# Patient Record
Sex: MALE | Race: WHITE | URBAN - METROPOLITAN AREA
[De-identification: names, ages, dates, MRNs, and addresses within clinical notes are randomized per-mention and may not be internally consistent; named-entity substitution may affect disease eponyms.]

---

## 2018-08-27 ENCOUNTER — TRANSFERRED RECORDS (OUTPATIENT)
Dept: HEALTH INFORMATION MANAGEMENT | Facility: CLINIC | Age: 73
End: 2018-08-27

## 2018-09-04 ENCOUNTER — TRANSFERRED RECORDS (OUTPATIENT)
Dept: HEALTH INFORMATION MANAGEMENT | Facility: CLINIC | Age: 73
End: 2018-09-04

## 2018-11-09 ENCOUNTER — TRANSFERRED RECORDS (OUTPATIENT)
Dept: HEALTH INFORMATION MANAGEMENT | Facility: CLINIC | Age: 73
End: 2018-11-09

## 2018-11-28 ENCOUNTER — PATIENT OUTREACH (OUTPATIENT)
Dept: CARE COORDINATION | Facility: CLINIC | Age: 73
End: 2018-11-28

## 2018-11-30 ENCOUNTER — OFFICE VISIT (OUTPATIENT)
Dept: INFECTIOUS DISEASES | Facility: CLINIC | Age: 73
End: 2018-11-30
Payer: MEDICARE

## 2018-11-30 VITALS
WEIGHT: 176.9 LBS | OXYGEN SATURATION: 95 % | BODY MASS INDEX: 28.43 KG/M2 | SYSTOLIC BLOOD PRESSURE: 154 MMHG | DIASTOLIC BLOOD PRESSURE: 92 MMHG | TEMPERATURE: 98.2 F | HEIGHT: 66 IN | HEART RATE: 80 BPM

## 2018-11-30 DIAGNOSIS — L73.9 FOLLICULITIS: Primary | ICD-10-CM

## 2018-11-30 DIAGNOSIS — Z22.321 COLONIZATION WITH MSSA (METHICILLIN-SUSCEPTIBLE STAPHYLOCOCCUS AUREUS): ICD-10-CM

## 2018-11-30 PROCEDURE — G0463 HOSPITAL OUTPT CLINIC VISIT: HCPCS | Mod: ZF

## 2018-11-30 RX ORDER — NEBULIZER ACCESSORIES
EACH MISCELLANEOUS
COMMUNITY
Start: 2018-04-18

## 2018-11-30 RX ORDER — MONTELUKAST SODIUM 10 MG/1
10 TABLET ORAL
COMMUNITY
Start: 2018-06-26

## 2018-11-30 RX ORDER — VENLAFAXINE HYDROCHLORIDE 75 MG/1
CAPSULE, EXTENDED RELEASE ORAL
Refills: 3 | COMMUNITY
Start: 2018-09-21

## 2018-11-30 RX ORDER — BETAMETHASONE DIPROPIONATE 0.5 MG/G
OINTMENT, AUGMENTED TOPICAL
COMMUNITY
Start: 2018-09-28

## 2018-11-30 RX ORDER — FLUTICASONE PROPIONATE 220 UG/1
AEROSOL, METERED RESPIRATORY (INHALATION)
Refills: 12 | COMMUNITY
Start: 2018-11-20

## 2018-11-30 RX ORDER — METOPROLOL TARTRATE 25 MG/1
TABLET, FILM COATED ORAL
Refills: 3 | COMMUNITY
Start: 2018-09-21

## 2018-11-30 RX ORDER — ACETAMINOPHEN 325 MG/1
2 TABLET ORAL
COMMUNITY

## 2018-11-30 RX ORDER — IPRATROPIUM BROMIDE AND ALBUTEROL SULFATE 2.5; .5 MG/3ML; MG/3ML
3 SOLUTION RESPIRATORY (INHALATION)
COMMUNITY
Start: 2018-04-18

## 2018-11-30 RX ORDER — DIPHENOXYLATE HYDROCHLORIDE AND ATROPINE SULFATE 2.5; .025 MG/1; MG/1
1 TABLET ORAL
COMMUNITY

## 2018-11-30 RX ORDER — ALBUTEROL SULFATE 90 UG/1
2 AEROSOL, METERED RESPIRATORY (INHALATION)
COMMUNITY
Start: 2018-06-26

## 2018-11-30 ASSESSMENT — PAIN SCALES - GENERAL: PAINLEVEL: NO PAIN (0)

## 2018-11-30 NOTE — NURSING NOTE
"Chief Complaint   Patient presents with     Consult     staph skin infection      BP (!) 154/92  Pulse 80  Temp 98.2  F (36.8  C) (Oral)  Ht 1.676 m (5' 6\")  Wt 80.2 kg (176 lb 14.4 oz)  SpO2 95%  BMI 28.55 kg/m2  Marzena Ivory, CMA    "

## 2018-11-30 NOTE — MR AVS SNAPSHOT
"              After Visit Summary   11/30/2018    Brandon Damian    MRN: [id number]           Patient Information     Date Of Birth          1945        Visit Information        Provider Department      11/30/2018 1:30 PM Kyler Mcfarland MD [facility] Infectious Diseases        Today's Diagnoses     Folliculitis    -  1    Colonization with MSSA (methicillin-susceptible Staphylococcus aureus)           Follow-ups after your visit        Follow-up notes from your care team     Return if symptoms worsen or fail to improve.      Who to contact     If you have questions or need follow up information about today's clinic visit or your schedule please contact [facility] AND INFECTIOUS DISEASES directly at [phone].  Normal or non-critical lab and imaging results will be communicated to you by MyChart, letter or phone within 4 business days after the clinic has received the results. If you do not hear from us within 7 days, please contact the clinic through MyChart or phone. If you have a critical or abnormal lab result, we will notify you by phone as soon as possible.  Submit refill requests through [employer] or call your pharmacy and they will forward the refill request to us. Please allow 3 business days for your refill to be completed.          Additional Information About Your Visit        MyChart Information     [employer] lets you send messages to your doctor, view your test results, renew your prescriptions, schedule appointments and more. To sign up, go to [email or web address] . Click on \"Log in\" on the left side of the screen, which will take you to the Welcome page. Then click on \"Sign up Now\" on the right side of the page.     You will be asked to enter the access code listed below, as well as some personal information. Please follow the directions to create your username and password.     Your access code is: [email or web address]  Expires: 2/14/2019  6:30 AM     Your access " "code will  in 90 days. If you need help or a new code, please call your [place] clinic or [phone].        Care EveryWhere ID     This is your Care EveryWhere ID. This could be used by other organizations to access your [place] medical records  [id number]        Your Vitals Were     Pulse Temperature Height Pulse Oximetry BMI (Body Mass Index)       80 98.2  F (36.8  C) (Oral) 1.676 m (5' 6\") 95% 28.55 kg/m2        Blood Pressure from Last 3 Encounters:   18 (!) 154/92    Weight from Last 3 Encounters:   18 80.2 kg (176 lb 14.4 oz)              Today, you had the following     No orders found for display       Primary Care Provider Office Phone # Fax #    Bryan Fritsch [phone]       [place] [address] [facility] [address]        Equal Access to Services     [facility]Y [facility] : Hadii aad ku hadasho Soomaali, waaxda luqadaha, qaybta kaalmada adeegyada, waxay idiin hayaan adeeg kharash latj . So [facility] [phone].    ATENCIÓN: Si habla español, tiene a peng disposición servicios gratuitos de asistencia lingüística. Llame al [phone].    We comply with applicable federal civil rights laws and [place] laws. We do not discriminate on the basis of race, color, national origin, age, disability, sex, sexual orientation, or gender identity.            Thank you!     Thank you for choosing [facility] INFECTIOUS DISEASES  for your care. Our goal is always to provide you with excellent care. Hearing back from our patients is one way we can continue to improve our services. Please take a few minutes to complete the written survey that you may receive in the mail after your visit with us. Thank you!             Your Updated Medication List - Protect others around you: Learn how to safely use, store and throw away your medicines at www.disposemymeds.org.          This list is accurate as of 18 11:59 PM.  Always use your most recent med list. "                   Brand Name Dispense Instructions for use Diagnosis    acetaminophen 325 MG tablet    TYLENOL     Take 2 tablets by mouth        albuterol 108 (90 Base) MCG/ACT inhaler    PROAIR HFA/PROVENTIL HFA/VENTOLIN HFA     Inhale 2 puffs into the lungs        augmented betamethasone dipropionate 0.05 % external ointment    DIPROLENE-AF     APPLY TO AFFECTED AREA(S) TWO TIMES A DAY FOR FLARES        FLOVENT  MCG/ACT inhaler   Generic drug:  fluticasone      INHALE ONE PUFF BY MOUTH TWICE A DAY        ipratropium - albuterol 0.5 mg/2.5 mg/3 mL 0.5-2.5 (3) MG/3ML neb solution    DUONEB     Inhale 3 mLs into the lungs        metoprolol tartrate 25 MG tablet    LOPRESSOR     TAKE THREE TABLETS BY MOUTH TWICE A DAY        montelukast 10 MG tablet    SINGULAIR     Take 10 mg by mouth        MULTI-VITAMINS Tabs      Take 1 tablet by mouth        Nebulizer Air Tube/Plugs Misc      Nebulizer, disposable neb kit x 4, reuseable neb kit x 1, mask x 1, filters x 1.   Frequency of use: daily; Length of need: until better        venlafaxine 75 MG 24 hr capsule    EFFEXOR-XR     TAKE ONE CAPSULE BY MOUTH ONCE DAILY WITH MEALS

## 2018-12-03 NOTE — PROGRESS NOTES
[facility]  Infectious Disease Clinic Note:  New Patient     Patient:  Brandon Damian, Date of birth 1945, Medical record number [id number]  Date of Visit:  12/02/2018  Consult requested by Dr Rober Shanks for recurrent staph aureus infections    This note was attested by Dr. Kerr  ATTESTATION   I interviewed and examined the patient myself. I also reviewed the chart and discussed the case with Dr. Mcfarland.   I agree with the assessment and plan as written which reflects my overall impression and recommendations.     Recurrent folliculitis that may benefit from decolonization though the success rate is limited.       Carroll Kerr  Seen and examined on 11/30/2018  Attested on 12/4/2018   Pager: [phone]           Assessment and Recommendations:   Assessment: 74 y/o M with recurrent furuncles secondary to MSSA over back.     Discussion: Decolonization can be attempted in this patient and wife, albeit with limited expectation of success. Did discuss the option of his wife getting a nasal screen and culture first, but his wife preferred that she undergo the treatment as well over waiting until she was tested. This was agreed upon.      Recommendations:  1. Chlorhexidine showers for patient and wife  - instructions provided to use 4% solution, from neck down, avoiding eyes, ears, nose and mouth, massage throughout body with special attention to crevices and folds, lather and rinse. To use daily for 5 days. To repeat after 1 month.  - Explained that effectiveness of treatment is only about 50%. Pt and wife expressed understanding.   - Patient's wife expressed concern about CHG containing formaldehyde which the patient is allergic to. Recommended Betasept as an option after checking ingredients online - no Formaldehyde or Iodine. Recommended double checking with pharmacist when buying OTC.  2. Avoid sharing things that come in contact with skin, including razors, towels, athletic  equipment.  3. To clean environmental fomites, including bedding, surfaces, countertops.   4. To consult with Vet and decolonize pet dog as well.     ID will sign off at this time.  Please do not hesitate to call with questions.  Follow up as needed.     Plan of care discussed with Staff ID attending Dr Carroll Kerr.    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     Per wife, patient has had 3 episodes of these infections over the past 6 months. The first one was in July, the second end of August, and the third early November. She is a nurse, and decided to bring him in to see if there is any workup or treatment required. Most recent infection has now healed after a course of Keflex. His infections start as an area of erythema, which then develop discharge and crusting. Pictures brought along by wife,showing redness, with central discharge and crusting. He did get an I&D when he had his first episode, which showed pus and necrotic material in the center. Cultures from this and the subsequent infection showed staph aureus, sensitive to oxacillin.     Patient denies any new change in lifestyle. No new gym usage. Does not share razors or towels. Cleans bedding weekly. Has been found to be allergic to formaldehyde recently on allergen testing, and this has been eliminated from household products. 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     Review of Systems:  CONSTITUTIONAL:  No fevers or chills. No night sweats.  EYES: negative for icterus or acute vision changes.   ENT:  negative for hearing loss, tinnitus or sore throat  RESPIRATORY:  negative for cough, sputum, dyspnea  CARDIOVASCULAR:  negative for chest pain, heart palpitations  GASTROINTESTINAL:  negative for nausea, vomiting, diarrhea or constipation  GENITOURINARY:   negative for dysuria or hematuria.  HEME:  No easy bruising or bleeding  INTEGUMENT:  As above  NEURO:  Negative for headache or tremor.    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 Polyp surgery in Feb 2018.       Family History   Problem Relation Age of Onset     Skin Cancer Father      Social History     Social History Narrative     No narrative on file     Social History   Substance Use Topics     Smoking status: Never Smoker     Smokeless tobacco: Never Used     Alcohol use No       Outpatient Prescriptions Marked as Taking for the 11/30/18 encounter (Office Visit) with Kyler Mcfarland MD   Medication Sig     acetaminophen (TYLENOL) 325 MG tablet Take 2 tablets by mouth     albuterol (PROAIR HFA/PROVENTIL HFA/VENTOLIN HFA) 108 (90 Base) MCG/ACT inhaler Inhale 2 puffs into the lungs     augmented betamethasone dipropionate (DIPROLENE-AF) 0.05 % external ointment APPLY TO AFFECTED AREA(S) TWO TIMES A DAY FOR FLARES     FLOVENT  MCG/ACT inhaler INHALE ONE PUFF BY MOUTH TWICE A DAY     ipratropium - albuterol 0.5 mg/2.5 mg/3 mL (DUONEB) 0.5-2.5 (3) MG/3ML neb solution Inhale 3 mLs into the lungs     metoprolol tartrate (LOPRESSOR) 25 MG tablet TAKE THREE TABLETS BY MOUTH TWICE A DAY     montelukast (SINGULAIR) 10 MG tablet Take 10 mg by mouth     Multiple Vitamin (MULTI-VITAMINS) TABS Take 1 tablet by mouth     Respiratory Therapy Supplies (NEBULIZER AIR TUBE/PLUGS) MISC Nebulizer, disposable neb kit x 4, reuseable neb kit x 1, mask x 1, filters x 1.   Frequency of use: daily; Length of  "need: until better     venlafaxine (EFFEXOR-XR) 75 MG 24 hr capsule TAKE ONE CAPSULE BY MOUTH ONCE DAILY WITH MEALS       Allergies   Allergen Reactions     Aspirin Difficulty breathing     Iodine      Other reaction(s): Bronchospasm  IV. Pt states this could have been anxiety     Salicylates      Other reaction(s): *Unknown     Clindamycin Itching and Rash     Allergic reaction to abx noted in 12.20.17 ED visit. Melody Strickland RN.....2/16/2018.....9:14 AM     Formaldehyde Rash            Physical Exam:   Vitals were reviewed.  All vitals stable  BP (!) 154/92  Pulse 80  Temp 98.2  F (36.8  C) (Oral)  Ht 1.676 m (5' 6\")  Wt 80.2 kg (176 lb 14.4 oz)  SpO2 95%  BMI 28.55 kg/m2  Wt Readings from Last 4 Encounters:   11/30/18 80.2 kg (176 lb 14.4 oz)     Exam:  GENERAL: well-developed, well-nourished, alert, oriented, in no acute distress.  HEENT: Head is normocephalic, atraumatic   EYES: Eyes have anicteric sclerae.    ENT: Oropharynx is moist without exudates or ulcers.  NECK: Supple.  LUNGS: Clear to auscultation.  CARDIOVASCULAR: Regular rate and rhythm with no murmurs, gallops or rubs.  ABDOMEN: Normal bowel sounds, soft, nontender.  SKIN: 3 small areas of erythema over lower back about 2 cm in diameter, with no tenderness, discharge, warmth, fluctuance. Similar area over anterior abdomen.   Shins with scabs from scratching bilaterally.   NEUROLOGIC: Grossly nonfocal.         Laboratory Data:   11/9/2018:    CULTURE RESULT (A)     CULTURE 4+ Staphylococcus aureus     GRAM STAIN                2+ PMNs     GRAM STAIN                2+ Gram Positive Cocci     Specimen   Other     Organism Antibiotic Method Susceptibility   Staphylococcus aureus OXACILLIN   0.5: S    Comment: Oxacillin susceptible should not be interpreted as penicillin or amoxicillin susceptible.    ERYTHROMYCIN   >=8: R    CLINDAMYCIN   <=0.25: S    TETRACYCLINE   <=1: S    CEFAZOLIN   S    VANCOMYCIN   1: S    LEVOFLOXACIN   0.25: S    " TRIMETHOPRIM/SULF   <=0.5/9.5: S     08/27/2018:      CULTURE RESULT (A)     CULTURE 4+ Staphylococcus aureus     GRAM STAIN                No PMNs     GRAM STAIN                3+ Gram Positive Cocci     Specimen   Other     Organism Antibiotic Method Susceptibility   Staphylococcus aureus OXACILLIN   0.5: S    Comment: Oxacillin susceptible should not be interpreted as penicillin or amoxicillin susceptible.    ERYTHROMYCIN   >=8: R    CLINDAMYCIN   <=0.25: S    TETRACYCLINE   <=1: S    CEFAZOLIN   S    VANCOMYCIN   <=0.5: S    LEVOFLOXACIN   0.25: S    TRIMETHOPRIM/SULF   <=0.5/9.5: S

## 2021-06-03 ENCOUNTER — RECORDS - HEALTHEAST (OUTPATIENT)
Dept: ADMINISTRATIVE | Facility: CLINIC | Age: 76
End: 2021-06-03